# Patient Record
Sex: MALE | Race: WHITE | Employment: STUDENT | URBAN - NONMETROPOLITAN AREA
[De-identification: names, ages, dates, MRNs, and addresses within clinical notes are randomized per-mention and may not be internally consistent; named-entity substitution may affect disease eponyms.]

---

## 2019-04-04 ENCOUNTER — HOSPITAL ENCOUNTER (EMERGENCY)
Age: 24
Discharge: HOME | End: 2019-04-04
Payer: COMMERCIAL

## 2019-04-04 VITALS
TEMPERATURE: 98.9 F | WEIGHT: 190 LBS | BODY MASS INDEX: 28.14 KG/M2 | RESPIRATION RATE: 18 BRPM | DIASTOLIC BLOOD PRESSURE: 93 MMHG | OXYGEN SATURATION: 99 % | SYSTOLIC BLOOD PRESSURE: 151 MMHG | HEART RATE: 72 BPM | HEIGHT: 69 IN

## 2019-04-04 DIAGNOSIS — S01.01XA LACERATION OF SCALP, INITIAL ENCOUNTER: Primary | ICD-10-CM

## 2019-04-04 DIAGNOSIS — S09.90XA MINOR HEAD INJURY, INITIAL ENCOUNTER: ICD-10-CM

## 2019-04-04 PROCEDURE — 90471 IMMUNIZATION ADMIN: CPT | Performed by: PHYSICIAN ASSISTANT

## 2019-04-04 PROCEDURE — 6360000002 HC RX W HCPCS: Performed by: PHYSICIAN ASSISTANT

## 2019-04-04 PROCEDURE — 99282 EMERGENCY DEPT VISIT SF MDM: CPT

## 2019-04-04 PROCEDURE — 2709999900 HC NON-CHARGEABLE SUPPLY

## 2019-04-04 PROCEDURE — 90715 TDAP VACCINE 7 YRS/> IM: CPT | Performed by: PHYSICIAN ASSISTANT

## 2019-04-04 RX ORDER — CEPHALEXIN 500 MG/1
500 CAPSULE ORAL 2 TIMES DAILY
Qty: 10 CAPSULE | Refills: 0 | Status: SHIPPED | OUTPATIENT
Start: 2019-04-04 | End: 2019-04-09

## 2019-04-04 RX ADMIN — TETANUS TOXOID, REDUCED DIPHTHERIA TOXOID AND ACELLULAR PERTUSSIS VACCINE, ADSORBED 0.5 ML: 5; 2.5; 8; 8; 2.5 SUSPENSION INTRAMUSCULAR at 13:16

## 2019-04-04 ASSESSMENT — ENCOUNTER SYMPTOMS
VOMITING: 0
ABDOMINAL PAIN: 0
NAUSEA: 0
COUGH: 0

## 2019-04-04 ASSESSMENT — PAIN DESCRIPTION - PAIN TYPE: TYPE: ACUTE PAIN

## 2019-04-04 ASSESSMENT — PAIN DESCRIPTION - LOCATION: LOCATION: HEAD

## 2019-04-04 ASSESSMENT — PAIN SCALES - GENERAL: PAINLEVEL_OUTOF10: 5

## 2019-04-04 NOTE — ED TRIAGE NOTES
Pt to ed with a head injury/laceration to the forehead. Pt reports that while at [facility] he was moving some parts and bent down to  a washer and when standing was hit in the head with a 30 pound yovani. Pt denies LOC. Pt reports that his professor cleaned and dressed the wound and advised him to be evaluated. Dressing to the forehead was removed and the bleeding to the forehead controled. Pt waiting to discuss the POC with the provider.

## 2019-04-04 NOTE — ED PROVIDER NOTES
[facility]  eMERGENCY dEPARTMENT eNCOUnter          CHIEF COMPLAINT       Chief Complaint   Patient presents with    Head Injury    Head Laceration       Nurses Notes reviewed and I agree except as noted inthe HPI. HISTORY OF PRESENT ILLNESS    Marcelina Leon is a 26 y.o. male who presents to the Emergency Department for the evaluation of scalp injury. Patient states that he was at school, working on an axle when he leaned over to  a washer and the axle rolled off of the table, bumping the top of his head. He denies forceful injury, LOC, vision changes, neck pain, numbness, weakness, confusion, vomiting. He states that he has a mild headache. He came for ED evaluation due to a laceration on his scalp, states he otherwise would not have sought medical treatment. Last tetanus immunization is unknown. He denies any need for pain medication. The HPI was provided by the patient. REVIEW OF SYSTEMS     Review of Systems   Constitutional: Negative for chills and fever. Eyes: Negative for visual disturbance. Respiratory: Negative for cough. Cardiovascular: Negative for chest pain. Gastrointestinal: Negative for abdominal pain, nausea and vomiting. Musculoskeletal: Negative for neck pain and neck stiffness. Skin: Positive for wound. Neurological: Positive for headaches. Negative for syncope, weakness, light-headedness and numbness. Psychiatric/Behavioral: Negative for confusion. PAST MEDICAL HISTORY    has no past medical history on file. SURGICAL HISTORY      has a past surgical history that includes Dental surgery. CURRENT MEDICATIONS       Previous Medications    No medications on file       ALLERGIES     is allergic to bactrim [sulfamethoxazole-trimethoprim]. FAMILY HISTORY     has no family status information on file. family history is not on file. SOCIAL HISTORY      reports that he has never smoked.  He has never used smokeless tobacco. He for complaints of scalp laceration. He notes injury with mild headache for which he declines need for pain medication. Tetanus immunization updated. Laceration repaired. Signs and symptoms of wound infection as well as proper wound care discussed. CT not indicated at this time. Patient advised of reasons for which to return for further ED evaluation and was agreeable, denying further needs at this time. CRITICAL CARE:   None     CONSULTS:  None    PROCEDURES:  Laceration Repair Procedure Note    Indication: Laceration    Procedure: The patient was placed in the appropriate position and anesthesia around the laceration was not performed at the patient's request. The area was then cleansed with Shur-Clens and draped in a sterile fashion. The laceration was closed with Dermabond. There were no additional lacerations requiring repair. Total repaired wound length: 2 cm. Other Items: None    The patient tolerated the procedure well. Complications: None      FINAL IMPRESSION      1. Laceration of scalp, initial encounter    2.  Minor head injury, initial encounter          DISPOSITION/PLAN   Discharge    PATIENT REFERRED TO:  [address] [id number] EMERGENCY DEPT  [address] [place] [address]  [phone]    If symptoms worsen      DISCHARGEMEDICATIONS:  New Prescriptions    CEPHALEXIN (KEFLEX) 500 MG CAPSULE    Take 1 capsule by mouth 2 times daily for 5 days       (Please note that portions of this note were completedwith a voice recognition program.  Efforts were made to edit the dictations but occasionally words are mis-transcribed.)        Patrick Jacobo PA-C  04/04/19 1305